# Patient Record
Sex: FEMALE | Race: ASIAN | NOT HISPANIC OR LATINO | Employment: UNEMPLOYED | URBAN - METROPOLITAN AREA
[De-identification: names, ages, dates, MRNs, and addresses within clinical notes are randomized per-mention and may not be internally consistent; named-entity substitution may affect disease eponyms.]

---

## 2022-01-20 ENCOUNTER — TELEPHONE (OUTPATIENT)
Dept: SCHEDULING | Facility: IMAGING CENTER | Age: 52
End: 2022-01-20

## 2022-10-05 LAB
CHOLEST SERPL-MCNC: 218 MG/DL
CREAT SERPL-MCNC: 0.82 MG/DL
HBA1C MFR BLD: 5.9 % (ref ?–5.8)
HDLC SERPL-MCNC: 34 MG/DL
LDLC SERPL CALC-MCNC: 153 MG/DL
TRIGL SERPL-MCNC: 176 MG/DL

## 2023-04-24 RX ORDER — ATORVASTATIN CALCIUM 10 MG/1
10 TABLET, FILM COATED ORAL NIGHTLY
COMMUNITY
End: 2024-01-16 | Stop reason: SDUPTHER

## 2023-04-24 RX ORDER — LISINOPRIL 10 MG/1
10 TABLET ORAL DAILY
COMMUNITY
End: 2024-01-16 | Stop reason: SDUPTHER

## 2023-04-24 RX ORDER — SEMAGLUTIDE 1.34 MG/ML
INJECTION, SOLUTION SUBCUTANEOUS
COMMUNITY
End: 2024-01-16 | Stop reason: SDUPTHER

## 2023-04-25 ENCOUNTER — OFFICE VISIT (OUTPATIENT)
Dept: ENDOCRINOLOGY | Facility: MEDICAL CENTER | Age: 53
End: 2023-04-25
Attending: INTERNAL MEDICINE
Payer: COMMERCIAL

## 2023-04-25 VITALS
WEIGHT: 161 LBS | SYSTOLIC BLOOD PRESSURE: 108 MMHG | HEART RATE: 65 BPM | DIASTOLIC BLOOD PRESSURE: 66 MMHG | HEIGHT: 63 IN | OXYGEN SATURATION: 95 % | BODY MASS INDEX: 28.53 KG/M2

## 2023-04-25 DIAGNOSIS — E05.90 SUBCLINICAL HYPERTHYROIDISM: ICD-10-CM

## 2023-04-25 DIAGNOSIS — E55.9 VITAMIN D DEFICIENCY: ICD-10-CM

## 2023-04-25 DIAGNOSIS — E04.2 MULTINODULAR GOITER: ICD-10-CM

## 2023-04-25 DIAGNOSIS — Z79.4 CONTROLLED TYPE 2 DIABETES MELLITUS WITHOUT COMPLICATION, WITH LONG-TERM CURRENT USE OF INSULIN (HCC): ICD-10-CM

## 2023-04-25 DIAGNOSIS — E11.9 CONTROLLED TYPE 2 DIABETES MELLITUS WITHOUT COMPLICATION, WITH LONG-TERM CURRENT USE OF INSULIN (HCC): ICD-10-CM

## 2023-04-25 LAB
HBA1C MFR BLD: 5.8 % (ref ?–5.8)
POCT INT CON NEG: NEGATIVE
POCT INT CON POS: POSITIVE

## 2023-04-25 PROCEDURE — 83036 HEMOGLOBIN GLYCOSYLATED A1C: CPT | Performed by: INTERNAL MEDICINE

## 2023-04-25 PROCEDURE — 99211 OFF/OP EST MAY X REQ PHY/QHP: CPT | Performed by: INTERNAL MEDICINE

## 2023-04-25 PROCEDURE — 99205 OFFICE O/P NEW HI 60 MIN: CPT | Performed by: INTERNAL MEDICINE

## 2023-04-25 ASSESSMENT — PATIENT HEALTH QUESTIONNAIRE - PHQ9: CLINICAL INTERPRETATION OF PHQ2 SCORE: 0

## 2023-04-25 NOTE — PROGRESS NOTES
"Chief Complaint:  Consult requested by No primary care provider on file. for initial evaluation of Type 2 Diabetes Mellitus    HPI:   Shakila Dickerson is a 52 y.o. female with Type 2 Diabetes Mellitus diagnosed in .  She denies hospitalizations for DKA in the past.    Her a1c was 6.6% on 2022  She is now on Ozempic 0.5mg weekly  She reports that she tried and failed metformin (hyperglycemia)    Her a1c today is 5.8% on 2023    She monitors blood glucose  1 times per day. Blood glucose values range:Fastin-130                She was also referred for subclinical hyperthyroidism  She was found to have a low TSH of 0.43 on 2022   free T4 and free T3 was not measured  She denies symptoms of overt hyperthyroid such as unexplained weight loss, tremors, palpitations and insomnia.  Her thyroid labs have not been repeated.        She also has MNG with previous monitoring done by Dr. Barry  With FNA of RUL complex nodule with benign cytology      Last US was on 2022 was in [place] showing a stable RUL complex nodule 1.8 cm   RLL poorly visualized hypoechoic nodule 1.4 cm  On the left upper lobe she also has subcentimeter nodes measuring 8 mm  She also had 1.2 cm nodule left lower lobe      She had an US last 2023 at [place] which showed a significant increase in the size of the right upper lobe nodule from 1.8 to 2.2 cm  And is again increase in the size of the right lower lobe nodule to 1.5 cm  The left upper lobe nodule stable  The left lower lobe nodule stable    She denies a family history of cancer she denies radiation exposure to the head and neck.          She denies hypoglycemic episodes  She  denies hypoglycemic unawareness. She denies episodes of severe hypoglycemia requiring third party assistance.  She  is not wearing a medical alert bracelet or necklace.  She does not a glucagon emergency kit.    She denies attending diabetes education classes.  Diet: \"healthy\" diet  in " general.    Diabetes Complications   She  denies history of retinopathy.  She denies laser eye surgery. Last eye exam: She has not had a formal eye exam  She denies history of peripheral sensory neuropathy.  She denies numbness, tingling in both feet.  She denies history of foot sores.   She denies history of kidney damage.  She is not on ACE inhibitor or ARB.   She denies history of coronary artery disease.  She  denies history of stroke and denies TIA.  She denies history of PAD.  She denies history of hyperlipidemia.      ROS:     CONS:     No fever, no chills, no weight loss, no fatigue   EYES:      No diplopia, no blurry vision, no redness of eyes, no swelling of eyelids   ENT:    No hearing loss, No ear pain, No sore throat, no dysphagia, no neck swelling   CV:     No chest pain, no palpitations, no claudication, no orthopnea, no PND   PULM:    No SOB, no cough, no hemoptysis, no wheezing    GI:   No nausea, no vomiting, no diarrhea, no constipation, no bloody stools   :  Passing urine well, no dysuria, no hematuria   ENDO:   No polyuria, no polydipsia, no heat intolerance, no cold intolerance   NEURO: No headaches, no dizziness, no convulsions, no tremors   MUSC:  No joint swellings, no arthralgias, no myalgias, no weakness   SKIN:   No rash, no ulcers, no dry skin   PSYCH:   No depression, no anxiety, no difficulty sleeping       Past Medical History:  There are no problems to display for this patient.      Past Surgical History:  No past surgical history on file.     Allergies:  Patient has no known allergies.     Current Medications:    Current Outpatient Medications:     atorvastatin (LIPITOR) 10 MG Tab, Take 10 mg by mouth every evening., Disp: , Rfl:     lisinopril (PRINIVIL) 10 MG Tab, Take 10 mg by mouth every day., Disp: , Rfl:     Semaglutide, 1 MG/DOSE, (OZEMPIC, 1 MG/DOSE,) 4 MG/3ML Solution Pen-injector, Inject  under the skin., Disp: , Rfl:     Social History:  Social History     Socioeconomic  "History    Marital status:      Spouse name: Not on file    Number of children: Not on file    Years of education: Not on file    Highest education level: Not on file   Occupational History    Not on file   Tobacco Use    Smoking status: Not on file    Smokeless tobacco: Not on file   Substance and Sexual Activity    Alcohol use: Not on file    Drug use: Not on file    Sexual activity: Not on file   Other Topics Concern    Not on file   Social History Narrative    Not on file     Social Determinants of Health     Financial Resource Strain: Not on file   Food Insecurity: Not on file   Transportation Needs: Not on file   Physical Activity: Not on file   Stress: Not on file   Social Connections: Not on file   Intimate Partner Violence: Not on file   Housing Stability: Not on file        Family History:   No family history on file.    PHYSICAL EXAM:   Vital signs: /66 (BP Location: Left arm, Patient Position: Sitting)   Pulse 65   Ht 1.6 m (5' 3\")   Wt 73 kg (161 lb)   SpO2 95%   BMI 28.52 kg/m²   GENERAL: Well-developed, well-nourished  in no apparent distress.   EYE: No ocular and eyelid asymmetry, Anicteric sclerae,  PERRL, No exophthalmos or lidlag  HENT: Hearing grossly intact, Normocephalic, atraumatic. Pink, moist mucous membranes, No exudate  NECK: Supple. Trachea midline. thyroid enlarged with a palpable nodule on the right upper lobe and right lower lobe  CARDIOVASCULAR: Regular rate and rhythm. No murmurs, rubs, or gallops.   LUNGS: Clear to auscultation bilaterally   ABDOMEN: Soft, nontender with positive bowel sounds.   EXTREMITIES: No clubbing, cyanosis, or edema.   NEUROLOGICAL: Cranial nerves II-XII are grossly intact   Symmetric reflexes at the patella no proximal muscle weakness, No visible tremor with both outstretched hands  LYMPH: No cervical, supraclavicular,  adenopathy palpated.   SKIN: No rashes, lesions. Turgor is normal.  FOOT: Normal sensation to monofilament testing, normal " pulses, no ulcers.  Normal Vibration quantitative sensation test.    Labs:  Lab Results   Component Value Date/Time    HBA1C 5.8 04/25/2023 0919       No results found for: WBC, RBC, HEMOGLOBIN, MCV, MCH, MCHC, RDW, MPV    Lab Results   Component Value Date/Time    SODIUM 138 12/07/2020 09:57 AM    POTASSIUM 3.9 12/07/2020 09:57 AM    CHLORIDE 106 12/07/2020 09:57 AM    CO2 25 12/07/2020 09:57 AM    ANION 7 12/07/2020 09:57 AM    BUN 12 12/07/2020 09:57 AM    CREATININE 0.82 10/05/2022 12:00 AM    CALCIUM 9.3 12/07/2020 09:57 AM       Lab Results   Component Value Date/Time    CHOLSTRLTOT 218 10/05/2022 0000    TRIGLYCERIDE 176 10/05/2022 0000    HDL 34 10/05/2022 0000     10/05/2022 0000       No results found for: MICROALBCALC, MALBCRT, MALBEXCR, NEWJPO36, MICROALBUR, MICRALB, UMICROALBUM, MICROALBTIM     No results found for: TSHULTRASEN  No results found for: FREEDIR  No results found for: FREET3  No results found for: THYSTIMIG      ASSESSMENT/PLAN:     1. Controlled type 2 diabetes mellitus without complication, with long-term current use of insulin (HCC)  Controlled  Reviewed pathogenesis of type 2 diabetes and reports good glucose control  Continue Ozempic 0.5 mg weekly  Reviewed side effects benefits and alternatives to the medication.  Reviewed proper administration of medication  I want to get updated labs to check her CBC, CMP, urine microalbumin, fasting lipids and other labs  I will update her   I am going to see her again in a couple months for biopsy of her thyroid nodules.  We plan to repeat her A1c again in 3 to 6 months    2. Subclinical hyperthyroidism  Stable  She appears clinically euthyroid  Her TSH was only mildly low  I suspect she has subclinical hyperthyroidism because of autonomous thyroid nodules  I want her to get repeat her labs to check her TSH and free T4 and T3 and also recheck her TSI and thyroid receptor antibodies and I will update her.    3. Multinodular goiter  Unstable    Reviewed pathogenesis of thyroid nodules and the risk of growth and risk of malignancies  I am going to schedule her for biopsy of the right upper lobe and right lower lobe nodule in the office    4. Vitamin D deficiency  Stable we will check calcium vitamin D with upcoming labs      Return in about 4 months (around 8/25/2023).    Total time spent on day of service was over 60 minutes which included obtaining a detailed history and physical exam, ordering labs, coordinating care and scheduling future follow-up      Thank you kindly for allowing me to participate in the diabetes care plan for this patient.    Jasiel Nelson MD, FACE, [facility]  04/25/23    CC:   No primary care provider on file.

## 2023-04-29 ENCOUNTER — HOSPITAL ENCOUNTER (OUTPATIENT)
Dept: LAB | Facility: MEDICAL CENTER | Age: 53
End: 2023-04-29
Attending: INTERNAL MEDICINE
Payer: COMMERCIAL

## 2023-04-29 DIAGNOSIS — Z79.4 CONTROLLED TYPE 2 DIABETES MELLITUS WITHOUT COMPLICATION, WITH LONG-TERM CURRENT USE OF INSULIN (HCC): ICD-10-CM

## 2023-04-29 DIAGNOSIS — E55.9 VITAMIN D DEFICIENCY: ICD-10-CM

## 2023-04-29 DIAGNOSIS — E11.9 CONTROLLED TYPE 2 DIABETES MELLITUS WITHOUT COMPLICATION, WITH LONG-TERM CURRENT USE OF INSULIN (HCC): ICD-10-CM

## 2023-04-29 DIAGNOSIS — E04.2 MULTINODULAR GOITER: ICD-10-CM

## 2023-04-29 DIAGNOSIS — E05.90 SUBCLINICAL HYPERTHYROIDISM: ICD-10-CM

## 2023-04-29 LAB
25(OH)D3 SERPL-MCNC: 19 NG/ML (ref 30–100)
ALBUMIN SERPL BCP-MCNC: 4.6 G/DL (ref 3.2–4.9)
ALBUMIN/GLOB SERPL: 1.8 G/DL
ALP SERPL-CCNC: 104 U/L (ref 30–99)
ALT SERPL-CCNC: 33 U/L (ref 2–50)
ANION GAP SERPL CALC-SCNC: 13 MMOL/L (ref 7–16)
AST SERPL-CCNC: 22 U/L (ref 12–45)
BASOPHILS # BLD AUTO: 0.7 % (ref 0–1.8)
BASOPHILS # BLD: 0.05 K/UL (ref 0–0.12)
BILIRUB SERPL-MCNC: 0.6 MG/DL (ref 0.1–1.5)
BUN SERPL-MCNC: 17 MG/DL (ref 8–22)
CALCIUM ALBUM COR SERPL-MCNC: 8.9 MG/DL (ref 8.5–10.5)
CALCIUM SERPL-MCNC: 9.4 MG/DL (ref 8.5–10.5)
CHLORIDE SERPL-SCNC: 106 MMOL/L (ref 96–112)
CHOLEST SERPL-MCNC: 155 MG/DL (ref 100–199)
CO2 SERPL-SCNC: 23 MMOL/L (ref 20–33)
CREAT SERPL-MCNC: 0.74 MG/DL (ref 0.5–1.4)
CREAT UR-MCNC: 180.73 MG/DL
EOSINOPHIL # BLD AUTO: 0.11 K/UL (ref 0–0.51)
EOSINOPHIL NFR BLD: 1.6 % (ref 0–6.9)
ERYTHROCYTE [DISTWIDTH] IN BLOOD BY AUTOMATED COUNT: 38.8 FL (ref 35.9–50)
FASTING STATUS PATIENT QL REPORTED: NORMAL
GFR SERPLBLD CREATININE-BSD FMLA CKD-EPI: 97 ML/MIN/1.73 M 2
GLOBULIN SER CALC-MCNC: 2.6 G/DL (ref 1.9–3.5)
GLUCOSE SERPL-MCNC: 101 MG/DL (ref 65–99)
HCT VFR BLD AUTO: 43.3 % (ref 37–47)
HDLC SERPL-MCNC: 41 MG/DL
HGB BLD-MCNC: 14.7 G/DL (ref 12–16)
IMM GRANULOCYTES # BLD AUTO: 0.02 K/UL (ref 0–0.11)
IMM GRANULOCYTES NFR BLD AUTO: 0.3 % (ref 0–0.9)
LDLC SERPL CALC-MCNC: 84 MG/DL
LYMPHOCYTES # BLD AUTO: 2.16 K/UL (ref 1–4.8)
LYMPHOCYTES NFR BLD: 30.8 % (ref 22–41)
MCH RBC QN AUTO: 29.6 PG (ref 27–33)
MCHC RBC AUTO-ENTMCNC: 33.9 G/DL (ref 33.6–35)
MCV RBC AUTO: 87.3 FL (ref 81.4–97.8)
MICROALBUMIN UR-MCNC: 2.7 MG/DL
MICROALBUMIN/CREAT UR: 15 MG/G (ref 0–30)
MONOCYTES # BLD AUTO: 0.45 K/UL (ref 0–0.85)
MONOCYTES NFR BLD AUTO: 6.4 % (ref 0–13.4)
NEUTROPHILS # BLD AUTO: 4.23 K/UL (ref 2–7.15)
NEUTROPHILS NFR BLD: 60.2 % (ref 44–72)
NRBC # BLD AUTO: 0 K/UL
NRBC BLD-RTO: 0 /100 WBC
PLATELET # BLD AUTO: 316 K/UL (ref 164–446)
PMV BLD AUTO: 9.5 FL (ref 9–12.9)
POTASSIUM SERPL-SCNC: 4.4 MMOL/L (ref 3.6–5.5)
PROT SERPL-MCNC: 7.2 G/DL (ref 6–8.2)
RBC # BLD AUTO: 4.96 M/UL (ref 4.2–5.4)
SODIUM SERPL-SCNC: 142 MMOL/L (ref 135–145)
T3FREE SERPL-MCNC: 3.43 PG/ML (ref 2–4.4)
T4 FREE SERPL-MCNC: 1.28 NG/DL (ref 0.93–1.7)
TRIGL SERPL-MCNC: 152 MG/DL (ref 0–149)
TSH SERPL DL<=0.005 MIU/L-ACNC: 0.64 UIU/ML (ref 0.38–5.33)
WBC # BLD AUTO: 7 K/UL (ref 4.8–10.8)

## 2023-04-29 PROCEDURE — 83520 IMMUNOASSAY QUANT NOS NONAB: CPT

## 2023-04-29 PROCEDURE — 80061 LIPID PANEL: CPT

## 2023-04-29 PROCEDURE — 82570 ASSAY OF URINE CREATININE: CPT

## 2023-04-29 PROCEDURE — 80053 COMPREHEN METABOLIC PANEL: CPT

## 2023-04-29 PROCEDURE — 84445 ASSAY OF TSI GLOBULIN: CPT

## 2023-04-29 PROCEDURE — 82306 VITAMIN D 25 HYDROXY: CPT

## 2023-04-29 PROCEDURE — 84481 FREE ASSAY (FT-3): CPT

## 2023-04-29 PROCEDURE — 84443 ASSAY THYROID STIM HORMONE: CPT

## 2023-04-29 PROCEDURE — 82043 UR ALBUMIN QUANTITATIVE: CPT

## 2023-04-29 PROCEDURE — 36415 COLL VENOUS BLD VENIPUNCTURE: CPT

## 2023-04-29 PROCEDURE — 84439 ASSAY OF FREE THYROXINE: CPT

## 2023-04-29 PROCEDURE — 85025 COMPLETE CBC W/AUTO DIFF WBC: CPT

## 2023-05-01 LAB
TSH RECEP AB SER-ACNC: <0.8 IU/L
TSI SER-ACNC: <0.1 IU/L

## 2023-08-09 PROBLEM — M65.331 TRIGGER FINGER, RIGHT MIDDLE FINGER: Status: ACTIVE | Noted: 2023-08-09

## 2023-09-11 ENCOUNTER — PROCEDURE VISIT (OUTPATIENT)
Dept: ENDOCRINOLOGY | Facility: MEDICAL CENTER | Age: 53
End: 2023-09-11
Attending: INTERNAL MEDICINE
Payer: COMMERCIAL

## 2023-09-11 ENCOUNTER — HOSPITAL ENCOUNTER (OUTPATIENT)
Facility: MEDICAL CENTER | Age: 53
End: 2023-09-11
Attending: INTERNAL MEDICINE
Payer: COMMERCIAL

## 2023-09-11 DIAGNOSIS — E55.9 VITAMIN D DEFICIENCY: ICD-10-CM

## 2023-09-11 DIAGNOSIS — E04.2 MULTINODULAR GOITER: ICD-10-CM

## 2023-09-11 DIAGNOSIS — E11.9 CONTROLLED TYPE 2 DIABETES MELLITUS WITHOUT COMPLICATION, WITH LONG-TERM CURRENT USE OF INSULIN (HCC): ICD-10-CM

## 2023-09-11 DIAGNOSIS — Z79.4 CONTROLLED TYPE 2 DIABETES MELLITUS WITHOUT COMPLICATION, WITH LONG-TERM CURRENT USE OF INSULIN (HCC): ICD-10-CM

## 2023-09-11 DIAGNOSIS — E05.90 SUBCLINICAL HYPERTHYROIDISM: ICD-10-CM

## 2023-09-11 LAB — PATHOLOGY CONSULT NOTE: NORMAL

## 2023-09-11 PROCEDURE — 10005 FNA BX W/US GDN 1ST LES: CPT | Performed by: INTERNAL MEDICINE

## 2023-09-11 PROCEDURE — 10006 FNA BX W/US GDN EA ADDL: CPT | Mod: 26 | Performed by: INTERNAL MEDICINE

## 2023-09-11 PROCEDURE — 10005 FNA BX W/US GDN 1ST LES: CPT | Mod: 26 | Performed by: INTERNAL MEDICINE

## 2023-09-11 PROCEDURE — 10006 FNA BX W/US GDN EA ADDL: CPT | Performed by: INTERNAL MEDICINE

## 2023-09-11 PROCEDURE — 88173 CYTOPATH EVAL FNA REPORT: CPT

## 2023-09-11 NOTE — PROGRESS NOTES
POC US guided FNA procedure was completed today for RUL 2.2 cm nodule and RLL 1.5 cm nodule    Please see endocrinologist procedure note  Patient tolerated procedure well without complaints.  Patient was advised that she will be contacted regarding the results and next plan  Patient was advised to follow up as planned with labs prior     Jasiel Nelson M.D.

## 2024-01-11 DIAGNOSIS — E04.2 MULTINODULAR GOITER: ICD-10-CM

## 2024-01-11 LAB
25(OH)D3+25(OH)D2 SERPL-MCNC: 48.4 NG/ML (ref 30–100)
ALBUMIN SERPL-MCNC: 4.8 G/DL (ref 3.8–4.9)
ALBUMIN/CREAT UR: 53 MG/G CREAT (ref 0–29)
ALBUMIN/GLOB SERPL: 1.8 {RATIO} (ref 1.2–2.2)
ALP SERPL-CCNC: 101 IU/L (ref 44–121)
ALT SERPL-CCNC: 33 IU/L (ref 0–32)
AST SERPL-CCNC: 23 IU/L (ref 0–40)
BILIRUB SERPL-MCNC: 0.5 MG/DL (ref 0–1.2)
BUN SERPL-MCNC: 13 MG/DL (ref 6–24)
BUN/CREAT SERPL: 17 (ref 9–23)
CALCIUM SERPL-MCNC: 9.8 MG/DL (ref 8.7–10.2)
CHLORIDE SERPL-SCNC: 103 MMOL/L (ref 96–106)
CHOLEST SERPL-MCNC: 253 MG/DL (ref 100–199)
CO2 SERPL-SCNC: 23 MMOL/L (ref 20–29)
CREAT SERPL-MCNC: 0.77 MG/DL (ref 0.57–1)
CREAT UR-MCNC: 109 MG/DL
EGFRCR SERPLBLD CKD-EPI 2021: 93 ML/MIN/1.73
GLOBULIN SER CALC-MCNC: 2.7 G/DL (ref 1.5–4.5)
GLUCOSE SERPL-MCNC: 98 MG/DL (ref 70–99)
HDLC SERPL-MCNC: 43 MG/DL
LABORATORY COMMENT REPORT: ABNORMAL
LDLC SERPL CALC-MCNC: 158 MG/DL (ref 0–99)
MICROALBUMIN UR-MCNC: 58.1 UG/ML
POTASSIUM SERPL-SCNC: 4.3 MMOL/L (ref 3.5–5.2)
PROT SERPL-MCNC: 7.5 G/DL (ref 6–8.5)
SODIUM SERPL-SCNC: 140 MMOL/L (ref 134–144)
T3FREE SERPL-MCNC: 3.5 PG/ML (ref 2–4.4)
T4 FREE SERPL-MCNC: 1.51 NG/DL (ref 0.82–1.77)
TRIGL SERPL-MCNC: 281 MG/DL (ref 0–149)
TSH RECEP AB SER-ACNC: <1.1 IU/L (ref 0–1.75)
TSH SERPL DL<=0.005 MIU/L-ACNC: 0.62 UIU/ML (ref 0.45–4.5)
TSI SER-ACNC: <0.1 IU/L (ref 0–0.55)
VLDLC SERPL CALC-MCNC: 52 MG/DL (ref 5–40)

## 2024-01-16 ENCOUNTER — OFFICE VISIT (OUTPATIENT)
Dept: ENDOCRINOLOGY | Facility: MEDICAL CENTER | Age: 54
End: 2024-01-16
Attending: INTERNAL MEDICINE
Payer: COMMERCIAL

## 2024-01-16 VITALS
OXYGEN SATURATION: 95 % | DIASTOLIC BLOOD PRESSURE: 64 MMHG | HEIGHT: 62 IN | SYSTOLIC BLOOD PRESSURE: 130 MMHG | WEIGHT: 165 LBS | HEART RATE: 72 BPM | BODY MASS INDEX: 30.36 KG/M2

## 2024-01-16 DIAGNOSIS — E04.2 MULTINODULAR GOITER: ICD-10-CM

## 2024-01-16 DIAGNOSIS — E11.9 CONTROLLED TYPE 2 DIABETES MELLITUS WITHOUT COMPLICATION, WITH LONG-TERM CURRENT USE OF INSULIN (HCC): ICD-10-CM

## 2024-01-16 DIAGNOSIS — E05.90 SUBCLINICAL HYPERTHYROIDISM: ICD-10-CM

## 2024-01-16 DIAGNOSIS — E55.9 VITAMIN D DEFICIENCY: ICD-10-CM

## 2024-01-16 DIAGNOSIS — R80.9 MICROALBUMINURIA: ICD-10-CM

## 2024-01-16 DIAGNOSIS — Z79.4 CONTROLLED TYPE 2 DIABETES MELLITUS WITHOUT COMPLICATION, WITH LONG-TERM CURRENT USE OF INSULIN (HCC): ICD-10-CM

## 2024-01-16 DIAGNOSIS — E78.5 DYSLIPIDEMIA: ICD-10-CM

## 2024-01-16 LAB
HBA1C MFR BLD: 5.9 % (ref ?–5.8)
POCT INT CON NEG: NEGATIVE
POCT INT CON POS: POSITIVE

## 2024-01-16 PROCEDURE — 99211 OFF/OP EST MAY X REQ PHY/QHP: CPT | Performed by: INTERNAL MEDICINE

## 2024-01-16 PROCEDURE — 3078F DIAST BP <80 MM HG: CPT | Performed by: INTERNAL MEDICINE

## 2024-01-16 PROCEDURE — 83036 HEMOGLOBIN GLYCOSYLATED A1C: CPT | Performed by: INTERNAL MEDICINE

## 2024-01-16 PROCEDURE — 3075F SYST BP GE 130 - 139MM HG: CPT | Performed by: INTERNAL MEDICINE

## 2024-01-16 PROCEDURE — 99215 OFFICE O/P EST HI 40 MIN: CPT | Performed by: INTERNAL MEDICINE

## 2024-01-16 RX ORDER — LISINOPRIL 10 MG/1
10 TABLET ORAL DAILY
Qty: 90 TABLET | Refills: 3 | Status: SHIPPED | OUTPATIENT
Start: 2024-01-16

## 2024-01-16 RX ORDER — SEMAGLUTIDE 1.34 MG/ML
1 INJECTION, SOLUTION SUBCUTANEOUS
Qty: 9 ML | Refills: 3 | Status: SHIPPED | OUTPATIENT
Start: 2024-01-16

## 2024-01-16 RX ORDER — ATORVASTATIN CALCIUM 20 MG/1
20 TABLET, FILM COATED ORAL NIGHTLY
Qty: 90 TABLET | Refills: 3 | Status: SHIPPED | OUTPATIENT
Start: 2024-01-16

## 2024-01-16 ASSESSMENT — FIBROSIS 4 INDEX: FIB4 SCORE: 0.66

## 2024-01-16 NOTE — PROGRESS NOTES
CHIEF COMPLAINT: Patient is here for follow up of Type 2 Diabetes Mellitus    HPI:     Gemxena Dickerson is a 53 y.o. female with Type 2 Diabetes Mellitus here for follow up.    Labs from 1/16/2024 HbA1c is 5.9%    She was diagnosed with type 2 diabetes in 2022 and has been on Ozempic monotherapy since that time.  She had previously tried and failed metformin because of severe hyperglycemia.    She has a history of subclinical hyperthyroidism with a low TSH of 0.43 on July 2022 but she was asymptomatic.  T4 and T3 levels on serial monitoring were normal and on serial monitoring later on her TSH returned to normal.  Workup for Graves' disease was unremarkable as her TSI and thyrotropin receptor antibodies were normal.    Baseline thyroid ultrasound at [facility] on August 2022 showed multinodular goiter with a right upper lobe nodule measuring 2.2 cm on April 2023 and right lower lobe solid nodule measuring 1.5 cm.  She denies a family history of thyroid cancer denies radiation exposure to the head and neck.    Ultrasound-guided biopsy of her thyroid nodules on September 2023 came back benign    Social history wise she works at [employer] and does shift work.  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  She has been taking red yeast rice and her LDL cholesterol still elevated.  She denies any side effects with atorvastatin.      She has new onset microalbuminuria seen on her latest labs  She is on an ACE inhibitor  UACR was 53 on January 2024  UACR was less than 30 on April 2023      Her  vitamin D is normal at 48 on June 2024      Eye exam was completed today and   foot exam was completed today      With respect to her subclinical hyperthyroidism there is no evidence of recurrence or progression of this condition    Her most recent TSH is normal at 0.619 with a normal free T4 of 1.51 and normal free T3 of 3.5 on July 2024 TSI and thyrotropin receptor antibodies were both negative ruling out Graves' disease.    She denies progressive compressive symptoms from her multinodular goiter    Her last formal ultrasound was completed at [facility] on April 2023 showing a 2.2 cm solid nodule in the right upper lobe and a 1.5 cm solid nodule in the right lower lobe which were biopsied and proven benign on September 2023        BG Diary:01/16/24  patient is not required to test her sugars    Weight has been stable    Diabetes Complications   Retinopathy: No known retinopathy.  Last eye exam: Point-of-care eye exam was completed today  Neuropathy: Denies paresthesias or numbness in hands or feet. Denies any foot wounds.  Exercise: Minimal.  Diet: Fair.  Patient's medications, allergies, and social histories were reviewed and updated as appropriate.    ROS:     CONS:     No fever, no chills   EYES:     No diplopia, no blurry vision   CV:           No chest pain, no palpitations   PULM:     No SOB, no cough, no hemoptysis.   GI:            No nausea, no vomiting, no diarrhea, no constipation   ENDO:     No polyuria, no polydipsia, no heat intolerance, no cold intolerance       Past Medical History:  Problem List:  2024-01: Dyslipidemia  2023-08: Trigger finger, right middle finger  2023-04: Subclinical hyperthyroidism  2023-04: Controlled type 2 diabetes mellitus without complication,   with long-term current use of insulin (HCC)  2023-04: Multinodular goiter      Past Surgical History:  History reviewed. No pertinent surgical history.     Allergies:  Patient has no known allergies.     Social History:  Social History  "    Tobacco Use    Smoking status: Never    Smokeless tobacco: Never        Family History:   family history is not on file.      PHYSICAL EXAM:   OBJECTIVE:  Vital signs: /64 (BP Location: Right arm, Patient Position: Sitting, BP Cuff Size: Adult)   Pulse 72   Ht 1.575 m (5' 2\")   Wt 74.8 kg (165 lb)   SpO2 95%   BMI 30.18 kg/m²   GENERAL: Well-developed, well-nourished in no apparent distress.   EYE:  No ocular asymmetry, PERRLA  HENT: Pink, moist mucous membranes.    NECK: Thyroid is not large  CARDIOVASCULAR:  No murmurs  LUNGS: Clear breath sounds  ABDOMEN: Soft, nontender   EXTREMITIES: No clubbing, cyanosis, or edema.   NEUROLOGICAL: No gross focal motor abnormalities   LYMPH: No cervical adenopathy palpated.   SKIN: No rashes, lesions.     Labs:  Lab Results   Component Value Date/Time    HBA1C 5.9 (A) 01/16/2024 11:01 AM        Lab Results   Component Value Date/Time    WBC 7.0 04/29/2023 07:54 AM    RBC 4.96 04/29/2023 07:54 AM    HEMOGLOBIN 14.7 04/29/2023 07:54 AM    MCV 87.3 04/29/2023 07:54 AM    MCH 29.6 04/29/2023 07:54 AM    MCHC 33.9 04/29/2023 07:54 AM    RDW 38.8 04/29/2023 07:54 AM    MPV 9.5 04/29/2023 07:54 AM       Lab Results   Component Value Date/Time    SODIUM 140 01/08/2024 09:12 AM    SODIUM 142 04/29/2023 07:54 AM    POTASSIUM 4.3 01/08/2024 09:12 AM    POTASSIUM 4.4 04/29/2023 07:54 AM    CHLORIDE 103 01/08/2024 09:12 AM    CHLORIDE 106 04/29/2023 07:54 AM    CO2 23 01/08/2024 09:12 AM    CO2 23 04/29/2023 07:54 AM    ANION 13.0 04/29/2023 07:54 AM    GLUCOSE 98 01/08/2024 09:12 AM    GLUCOSE 101 (H) 04/29/2023 07:54 AM    BUN 13 01/08/2024 09:12 AM    BUN 17 04/29/2023 07:54 AM    CREATININE 0.77 01/08/2024 09:12 AM    CREATININE 0.74 04/29/2023 07:54 AM    CREATININE 0.82 10/05/2022 12:00 AM    CALCIUM 9.8 01/08/2024 09:12 AM    CALCIUM 9.4 04/29/2023 07:54 AM    ASTSGOT 23 01/08/2024 09:12 AM    ASTSGOT 22 04/29/2023 07:54 AM    ALTSGPT 33 (H) 01/08/2024 09:12 AM    " "ALTSGPT 33 04/29/2023 07:54 AM    TBILIRUBIN 0.5 01/08/2024 09:12 AM    TBILIRUBIN 0.6 04/29/2023 07:54 AM    ALBUMIN 4.8 01/08/2024 09:12 AM    ALBUMIN 4.6 04/29/2023 07:54 AM    TOTPROTEIN 7.5 01/08/2024 09:12 AM    TOTPROTEIN 7.2 04/29/2023 07:54 AM    GLOBULIN 2.7 01/08/2024 09:12 AM    GLOBULIN 2.6 04/29/2023 07:54 AM    AGRATIO 1.8 01/08/2024 09:12 AM    AGRATIO 1.8 04/29/2023 07:54 AM       Lab Results   Component Value Date/Time    CHOLSTRLTOT 253 (H) 01/08/2024 0912    TRIGLYCERIDE 281 (H) 01/08/2024 0912    HDL 43 01/08/2024 0912    LDL 84 04/29/2023 0754       Lab Results   Component Value Date/Time    MALBCRT 15 04/29/2023 07:54 AM    MICROALBUR 2.7 04/29/2023 07:54 AM    MICRALB 58.1 01/08/2024 09:12 AM        Lab Results   Component Value Date/Time    TSHULTRASEN 0.640 04/29/2023 0754     Lab Results   Component Value Date/Time    FREEDIR 1.51 01/08/2024 0912     Lab Results   Component Value Date/Time    FREET3 3.43 04/29/2023 0754     No results found for: \"THYSTIMIG\"        ASSESSMENT/PLAN:     1. Controlled type 2 diabetes mellitus without complication, with long-term current use of insulin (HCC)  Well-controlled  Continue Ozempic 1 mg weekly  She is up-to-date with her labs  Foot exam was completed today  Eye exam was completed today  Follow-up for thyroid ultrasound the office in 3 months we will make her diabetes appoint for her in 6 months    2. Subclinical hyperthyroidism  Resolved she does not have endogenous progressive subclinical hypothyroidism and there is no evidence of Graves' disease   we will monitor thyroid function annually at this time    3. Multinodular goiter  Stable  She has benign dominant solid nodules on the right upper lobe and right lower lobe both measuring more than 1.5 to 2 cm with benign biopsies  Recommend observation and schedule her for ultrasound in the office in 3 months    4. Dyslipidemia  Uncontrolled  Stop red yeast rice and restart atorvastatin  Repeat fasting " lipids in 3 to 6 months    5. Vitamin D deficiency  Stable   Vitamin D labs were reviewed with patient  Continue current supplements  Continue monitoring levels       6. Microalbuminuria  Unstable  Continue ACE inhibitor  Consider SGLT2 inhibitor in the future if this is progressive  Consider Kerendia if this is progressive  Continue monitoring urine albumin every 3 to 6 months      Return in about 3 months (around 4/16/2024).        Total time spent on day of service was over 60 minutes which included obtaining a detailed history and physical exam, ordering labs, coordinating care and scheduling future follow-up      This patient during there office visit today was started on a new medication.  Side effects of the new medication were discussed with the patient today in the office.     Thank you kindly for allowing me to participate in the diabetes care plan for this patient.    Jasiel Nelson MD, [facility], [facility]  01/16/24    CC:   Malika Ace M.D.

## 2024-01-18 LAB — RETINAL SCREEN: NORMAL

## 2024-04-08 ENCOUNTER — PROCEDURE VISIT (OUTPATIENT)
Dept: ENDOCRINOLOGY | Facility: MEDICAL CENTER | Age: 54
End: 2024-04-08
Attending: INTERNAL MEDICINE
Payer: COMMERCIAL

## 2024-04-08 DIAGNOSIS — E55.9 VITAMIN D DEFICIENCY: ICD-10-CM

## 2024-04-08 DIAGNOSIS — E78.5 DYSLIPIDEMIA: ICD-10-CM

## 2024-04-08 DIAGNOSIS — E05.90 SUBCLINICAL HYPERTHYROIDISM: ICD-10-CM

## 2024-04-08 DIAGNOSIS — E11.9 CONTROLLED TYPE 2 DIABETES MELLITUS WITHOUT COMPLICATION, WITH LONG-TERM CURRENT USE OF INSULIN (HCC): ICD-10-CM

## 2024-04-08 DIAGNOSIS — E04.2 MULTINODULAR GOITER: ICD-10-CM

## 2024-04-08 DIAGNOSIS — Z79.4 CONTROLLED TYPE 2 DIABETES MELLITUS WITHOUT COMPLICATION, WITH LONG-TERM CURRENT USE OF INSULIN (HCC): ICD-10-CM

## 2024-04-08 PROCEDURE — 76536 US EXAM OF HEAD AND NECK: CPT | Performed by: INTERNAL MEDICINE

## 2024-04-08 RX ORDER — SEMAGLUTIDE 1.34 MG/ML
1 INJECTION, SOLUTION SUBCUTANEOUS
Qty: 9 ML | Refills: 3 | Status: SHIPPED | OUTPATIENT
Start: 2024-04-08

## 2024-04-08 NOTE — PROGRESS NOTES
Thyroid US done on this procedure visit  She has stable benign spongiform nodules on the RUL, RLL and LLL  Please see dictated POC US report completed by endocrinologist  I refilled her ozempic and made her a 1 year diabetes follow up   With complete labs  Patient advised to follow up as planned with labs prior    Jasiel Nelson M.D.

## 2025-03-11 DIAGNOSIS — E78.5 DYSLIPIDEMIA: ICD-10-CM

## 2025-03-12 RX ORDER — ATORVASTATIN CALCIUM 20 MG/1
20 TABLET, FILM COATED ORAL NIGHTLY
Qty: 90 TABLET | Refills: 0 | Status: SHIPPED | OUTPATIENT
Start: 2025-03-12

## 2025-03-26 LAB
CHOLEST SERPL-MCNC: 146 MG/DL
HBA1C MFR BLD: 7.4 % (ref ?–5.8)
HDLC SERPL-MCNC: 40 MG/DL
LDLC SERPL CALC-MCNC: 78 MG/DL
TRIGL SERPL-MCNC: 159 MG/DL

## 2025-04-25 ENCOUNTER — APPOINTMENT (OUTPATIENT)
Facility: MEDICAL CENTER | Age: 55
End: 2025-04-25
Payer: COMMERCIAL

## 2025-04-25 ENCOUNTER — OFFICE VISIT (OUTPATIENT)
Dept: ENDOCRINOLOGY | Facility: MEDICAL CENTER | Age: 55
End: 2025-04-25
Attending: INTERNAL MEDICINE
Payer: COMMERCIAL

## 2025-04-25 VITALS
DIASTOLIC BLOOD PRESSURE: 94 MMHG | OXYGEN SATURATION: 98 % | HEART RATE: 80 BPM | BODY MASS INDEX: 29.95 KG/M2 | WEIGHT: 169 LBS | SYSTOLIC BLOOD PRESSURE: 137 MMHG | HEIGHT: 63 IN

## 2025-04-25 DIAGNOSIS — E04.2 MULTINODULAR GOITER: ICD-10-CM

## 2025-04-25 DIAGNOSIS — E78.5 DYSLIPIDEMIA: ICD-10-CM

## 2025-04-25 DIAGNOSIS — E55.9 VITAMIN D DEFICIENCY: ICD-10-CM

## 2025-04-25 DIAGNOSIS — Z79.4 CONTROLLED TYPE 2 DIABETES MELLITUS WITHOUT COMPLICATION, WITH LONG-TERM CURRENT USE OF INSULIN (HCC): ICD-10-CM

## 2025-04-25 DIAGNOSIS — E11.9 CONTROLLED TYPE 2 DIABETES MELLITUS WITHOUT COMPLICATION, WITH LONG-TERM CURRENT USE OF INSULIN (HCC): ICD-10-CM

## 2025-04-25 DIAGNOSIS — R80.9 MICROALBUMINURIA: ICD-10-CM

## 2025-04-25 DIAGNOSIS — E05.90 SUBCLINICAL HYPERTHYROIDISM: ICD-10-CM

## 2025-04-25 PROCEDURE — 99211 OFF/OP EST MAY X REQ PHY/QHP: CPT | Performed by: INTERNAL MEDICINE

## 2025-04-25 RX ORDER — SEMAGLUTIDE 1.34 MG/ML
1 INJECTION, SOLUTION SUBCUTANEOUS
Qty: 9 ML | Refills: 3 | Status: SHIPPED | OUTPATIENT
Start: 2025-04-25

## 2025-04-25 RX ORDER — METFORMIN HYDROCHLORIDE 500 MG/1
1000 TABLET, EXTENDED RELEASE ORAL 2 TIMES DAILY
Qty: 360 TABLET | Refills: 3 | Status: SHIPPED | OUTPATIENT
Start: 2025-04-25

## 2025-04-25 ASSESSMENT — FIBROSIS 4 INDEX: FIB4 SCORE: 0.67

## 2025-04-25 NOTE — PROGRESS NOTES
CHIEF COMPLAINT: Patient is here for follow up of Type 2 Diabetes Mellitus    HPI:     Gemma General Jayla is a 54 y.o. female with Type 2 Diabetes Mellitus here for follow up.      Labs from 3/26/2025 Hba1c is 7.3%  Labs from 1/16/2024 HbA1c is 5.9%    She was diagnosed with type 2 diabetes in 2022 and has been on Ozempic monotherapy since that time.  She had previously tried and failed metformin because of severe hyperglycemia.    She has a history of subclinical hyperthyroidism with a low TSH of 0.43 on July 2022 but she was asymptomatic.  T4 and T3 levels on serial monitoring were normal and on serial monitoring later on her TSH returned to normal.  Workup for Graves' disease was unremarkable as her TSI and thyrotropin receptor antibodies were normal.    Baseline thyroid ultrasound at [facility] on August 2022 showed multinodular goiter with a right upper lobe nodule measuring 2.2 cm on April 2023 and right lower lobe solid nodule measuring 1.5 cm.  She denies a family history of thyroid cancer denies radiation exposure to the head and neck.    Ultrasound-guided biopsy of her thyroid nodules on September 2023 came back benign    Social history wise she works at [employer] and does shift work.  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          On follow-up   She remains on Ozempic 2 mg weekly      She denies any side effects   She reports that her insurance wont cover the Ozempic 2mg dose      She has uncontrolled hyperlipidemia total cholesterol was 250 and LDL cholesterol was 158 on January 2024  She is back on atorvastatin and she stopped red yeast rice  LDL C was 78 on 3/2025 ([facility])      She has new onset microalbuminuria seen on her latest labs  She is on an ACE inhibitor  We dont have an updated urine test  UACR was 53 on January 2024  UACR was less than 30 on April 2023      Her vitamin D is normal at 48 on June 2024      Eye exam was completed today and   foot exam was completed  today      With respect to her subclinical hyperthyroidism there is no evidence of recurrence or progression of this condition    Her most recent TSH is normal at 0.511 on 3/2025  TSI and thyrotropin receptor antibodies were both negative ruling out Graves' disease.    She denies progressive compressive symptoms from her multinodular goiter    Her formal ultrasound was completed at [place] on April 2023 showing a 2.2 cm solid nodule in the right upper lobe and a 1.5 cm solid nodule in the right lower lobe which were biopsied and proven benign on September 2023        Her formal US on April 2024 showed    Nodule #1  There is a(n) hypoechoic wider than tall spongiform nodule with ill-defined margins and no echogenic foci measuring 1.44 x 0.91 x 1.59 cm located on the right upper lobe near the isthmus. This nodule was previously biopsied and is stable     Nodule #2  There is a(n) hypoechoic wider than tall spongiform nodule with ill-defined margins and no echogenic foci measuring 1.05 x 0.59 x 1.01 cm located on the right lower lobe. This nodule was previously biopsied and is stable     Nodule #3  There is a(n) hypoechoic wider than tall spongiform nodule with smooth margins and no echogenic foci measuring 0.89 x 0.68 x 0.86 cm located on the left lower lobe. This nodule is newly detected      BG Diary:  patient is not required to test her sugars    Weight has been stable    Diabetes Complications   Retinopathy: No known retinopathy.  Last eye exam: Point-of-care eye exam was completed today  Neuropathy: Denies paresthesias or numbness in hands or feet. Denies any foot wounds.  Exercise: Minimal.  Diet: Fair.  Patient's medications, allergies, and social histories were reviewed and updated as appropriate.    ROS:     CONS:     No fever, no chills   EYES:     No diplopia, no blurry vision   CV:           No chest pain, no palpitations   PULM:     No SOB, no cough, no hemoptysis.   GI:            No nausea, no vomiting,  "no diarrhea, no constipation   ENDO:     No polyuria, no polydipsia, no heat intolerance, no cold intolerance       Past Medical History:  Problem List:  2024-01: Dyslipidemia  2023-08: Trigger finger, right middle finger  2023-04: Subclinical hyperthyroidism  2023-04: Controlled type 2 diabetes mellitus without complication,   with long-term current use of insulin ([facility])  2023-04: Multinodular goiter      Past Surgical History:  History reviewed. No pertinent surgical history.     Allergies:  Patient has no known allergies.     Social History:  Social History     Tobacco Use    Smoking status: Never    Smokeless tobacco: Never        Family History:   family history is not on file.      PHYSICAL EXAM:   OBJECTIVE:  Vital signs: BP (!) 137/94 (BP Location: Left arm, Patient Position: Sitting, BP Cuff Size: Adult long)   Pulse 80   Ht 1.6 m (5' 3\")   Wt 76.7 kg (169 lb)   SpO2 98%   BMI 29.94 kg/m²   GENERAL: Well-developed, well-nourished in no apparent distress.   EYE:  No ocular asymmetry, PERRLA  HENT: Pink, moist mucous membranes.    NECK: Thyroid is not large  CARDIOVASCULAR:  No murmurs  LUNGS: Clear breath sounds  ABDOMEN: Soft, nontender   EXTREMITIES: No clubbing, cyanosis, or edema.   NEUROLOGICAL: No gross focal motor abnormalities   LYMPH: No cervical adenopathy palpated.   SKIN: No rashes, lesions.     Labs:  Lab Results   Component Value Date/Time    HBA1C 5.9 (A) 01/16/2024 11:01 AM        Lab Results   Component Value Date/Time    WBC 7.0 04/29/2023 07:54 AM    RBC 4.96 04/29/2023 07:54 AM    HEMOGLOBIN 14.7 04/29/2023 07:54 AM    MCV 87.3 04/29/2023 07:54 AM    MCH 29.6 04/29/2023 07:54 AM    MCHC 33.9 04/29/2023 07:54 AM    RDW 38.8 04/29/2023 07:54 AM    MPV 9.5 04/29/2023 07:54 AM       Lab Results   Component Value Date/Time    SODIUM 140 01/08/2024 09:12 AM    SODIUM 142 04/29/2023 07:54 AM    POTASSIUM 4.3 01/08/2024 09:12 AM    POTASSIUM 4.4 04/29/2023 07:54 AM    CHLORIDE 103 01/08/2024 " "09:12 AM    CHLORIDE 106 2023 07:54 AM    CO2 23 2024 09:12 AM    CO2 23 2023 07:54 AM    ANION 13.0 2023 07:54 AM    GLUCOSE 98 2024 09:12 AM    GLUCOSE 101 (H) 2023 07:54 AM    BUN 13 2024 09:12 AM    BUN 17 2023 07:54 AM    CREATININE 0.77 2024 09:12 AM    CREATININE 0.74 2023 07:54 AM    CREATININE 0.82 10/05/2022 12:00 AM    CALCIUM 9.8 2024 09:12 AM    CALCIUM 9.4 2023 07:54 AM    ASTSGOT 23 2024 09:12 AM    ASTSGOT 22 2023 07:54 AM    ALTSGPT 33 (H) 2024 09:12 AM    ALTSGPT 33 2023 07:54 AM    TBILIRUBIN 0.5 2024 09:12 AM    TBILIRUBIN 0.6 2023 07:54 AM    ALBUMIN 4.8 2024 09:12 AM    ALBUMIN 4.6 2023 07:54 AM    TOTPROTEIN 7.5 2024 09:12 AM    TOTPROTEIN 7.2 2023 07:54 AM    GLOBULIN 2.7 2024 09:12 AM    GLOBULIN 2.6 2023 07:54 AM    AGRATIO 1.8 2024 09:12 AM    AGRATIO 1.8 2023 07:54 AM       Lab Results   Component Value Date/Time    CHOLSTRLTOT 253 (H) 2024 0912    TRIGLYCERIDE 281 (H) 2024 0912    HDL 43 2024 0912    LDL 84 2023 0754       Lab Results   Component Value Date/Time    MALBCRT 15 2023 07:54 AM    MICROALBUR 2.7 2023 07:54 AM    MICRALB 58.1 2024 09:12 AM        Lab Results   Component Value Date/Time    TSHULTRASEN 0.640 2023 0754     Lab Results   Component Value Date/Time    FREEDIR 1.51 2024 0912     Lab Results   Component Value Date/Time    FREET3 3.43 2023 0754     No results found for: \"THYSTIMIG\"    IMAGIN2024 1:28 PM     HISTORY/REASON FOR EXAM:  Follow-up for multinodular goiter history of subclinical hyperthyroidism now resolved        TECHNIQUE/EXAM DESCRIPTION:  Ultrasound of the soft tissues of the head and neck.     COMPARISON:  Previous ultrasound from [place] done in      FINDINGS:  The thyroid gland is heterogeneous.  Vascularity is normal.     The " right lobe of the thyroid gland measures 6.10 cm x 2.37 cm x 2.56 cm cm.  The left lobe of the thyroid gland measures 5.63 cm x 1.92 cm x 2.06 cm cm.  The isthmus measures 0.42 cm cm.     Nodules >= 1cm:        Nodule #1  There is a(n) hypoechoic wider than tall spongiform nodule with ill-defined margins and no echogenic foci measuring 1.44 x 0.91 x 1.59 cm located on the right upper lobe near the isthmus. This nodule was previously biopsied and is stable     Nodule #2  There is a(n) hypoechoic wider than tall spongiform nodule with ill-defined margins and no echogenic foci measuring 1.05 x 0.59 x 1.01 cm located on the right lower lobe. This nodule was previously biopsied and is stable     Nodule #3  There is a(n) hypoechoic wider than tall spongiform nodule with smooth margins and no echogenic foci measuring 0.89 x 0.68 x 0.86 cm located on the left lower lobe. This nodule is newly detected              IMPRESSION:     Heterogenous thyroid gland with dominant: Stable spongiform nodules located on the right upper lobe, right lower lobe, and left lower lobe     MAYUR Recommendations  Observation - repeat US in 12 to 24 months in the office.              US report completed and dictated by  Jasiel Nelson MD, FACE, EC[facility]    ASSESSMENT/PLAN:     1. Controlled type 2 diabetes mellitus without complication, with long-term current use of insulin (HCC)  Previously controlled  Her insurance is not covering the 2 mg dose of Ozempic  We will reduce dose to 1 mg weekly  Restart metformin  Explained to her that metformin should work in combination with Ozempic to help improve her glucose control  She needs to get an updated urine albumin  Foot exam was completed today  Follow-up with clinical pharmacist in 3 months    2. Subclinical hyperthyroidism  Resolved she does not have endogenous progressive subclinical hypothyroidism and there is no evidence of Graves' disease   we will monitor thyroid function annually at this  time    3. Multinodular goiter  Stable  She has benign dominant solid nodules on the right upper lobe and right lower lobe both measuring more than 1.5 to 2 cm with benign biopsies  She had an ultrasound in the office on October 2024  Showing stable nodules  Recommend observation and repeating ultrasound again later this year with radiology      4. Dyslipidemia  Controlled  Continue restart atorvastatin  Repeat fasting lipids in 12 months    5. Vitamin D deficiency  Stable   Vitamin D labs were reviewed with patient  Continue current supplements  Continue monitoring levels       6. Microalbuminuria  Unstable  Continue ACE inhibitor  I want her to get an updated urine microalbumin now  Consider SGLT2 inhibitor in the future if this is progressive        Return in about 3 months (around 7/25/2025).        Total time spent on day of service was over 60 minutes which included obtaining a detailed history and physical exam, ordering labs, coordinating care and scheduling future follow-up      Thank you kindly for allowing me to participate in the diabetes care plan for this patient.    Jasiel Nelson MD, FACE, [facility]      CC:   Malika Ace M.D.

## 2025-05-06 LAB
ALBUMIN/CREAT UR: 13 MG/G CREAT (ref 0–29)
CREAT UR-MCNC: 141.1 MG/DL
MICROALBUMIN UR-MCNC: 18.3 UG/ML

## 2025-07-28 ENCOUNTER — OFFICE VISIT (OUTPATIENT)
Dept: ENDOCRINOLOGY | Facility: MEDICAL CENTER | Age: 55
End: 2025-07-28
Attending: INTERNAL MEDICINE
Payer: COMMERCIAL

## 2025-07-28 VITALS
BODY MASS INDEX: 29.76 KG/M2 | DIASTOLIC BLOOD PRESSURE: 78 MMHG | WEIGHT: 168 LBS | HEART RATE: 75 BPM | SYSTOLIC BLOOD PRESSURE: 127 MMHG

## 2025-07-28 DIAGNOSIS — E78.5 DYSLIPIDEMIA: ICD-10-CM

## 2025-07-28 DIAGNOSIS — E05.90 SUBCLINICAL HYPERTHYROIDISM: ICD-10-CM

## 2025-07-28 DIAGNOSIS — E55.9 VITAMIN D DEFICIENCY: ICD-10-CM

## 2025-07-28 DIAGNOSIS — R80.9 MICROALBUMINURIA: ICD-10-CM

## 2025-07-28 DIAGNOSIS — E04.2 MULTINODULAR GOITER: ICD-10-CM

## 2025-07-28 DIAGNOSIS — E11.9 CONTROLLED TYPE 2 DIABETES MELLITUS WITHOUT COMPLICATION, WITH LONG-TERM CURRENT USE OF INSULIN (HCC): ICD-10-CM

## 2025-07-28 DIAGNOSIS — Z79.4 CONTROLLED TYPE 2 DIABETES MELLITUS WITHOUT COMPLICATION, WITH LONG-TERM CURRENT USE OF INSULIN (HCC): ICD-10-CM

## 2025-07-28 LAB
HBA1C MFR BLD: 6.6 % (ref ?–5.8)
POCT INT CON NEG: NEGATIVE
POCT INT CON POS: POSITIVE

## 2025-07-28 PROCEDURE — 83036 HEMOGLOBIN GLYCOSYLATED A1C: CPT

## 2025-07-28 PROCEDURE — 99214 OFFICE O/P EST MOD 30 MIN: CPT

## 2025-07-28 NOTE — PROGRESS NOTES
Patient Consult Note    Primary care physician: Malika Ace M.D.    Reason for Consult: Management of Controlled Type 2 Diabetes    Date Referral Placed: 04/25/25    HPI:  Shakila Dickerson is a 54 y.o. old patient who comes in today for evaluation of above stated problem.    Allergies  Patient has no known allergies.    Current Diabetes Medication Regimen  Metformin ER: 500 mg BID  GLP-1 or GLP-1/GIP Agent: semaglutide (Ozempic) 1 mg weekly    Previous Diabetes Medications and Reason for Discontinuation  None    Preventative Medications  On ACEi/ARB: yes - lisinopril 10 mg BID  On statin or other lipid-lowering therapy: yes - atorvastatin 20 mg daily    Potential Barriers to Care:  Adherence: reports occasional missed doses of Ozempic, recommended for pt to set a weekly reminder/alarm  Side effects: none  Affordability: no issues    SMBG  Pt has home glucometer and proper testing technique - yes    Pt reports blood sugars:   Before Breakfast: average 135, 100-146    Hyperglycemia/Hypoglycemia  Hyperglycemia symptoms: nocturia, polyuria  Hypoglycemia awareness: Yes  Nocturnal hypoglycemia: None  Hypoglycemia:  None  Pt's treatment of Hypoglycemia: Discussed 15:15 Rule    Lifestyle  Current Exercise - treadmill x 30 mins daily when she is off of work, otherwise works at [employer] and does a lot of walking    Dietary (pt works night shift)  Breakfast - rice, fish/meat, veggies  Lunch - sandwich, salad/fruits  Snacks - biscuits, chips  Drinks - water, zero-sugar gatorade    Other  Pt would like to establish care with a new PCP. Referral to primary care placed.    Labs  Lab Results   Component Value Date/Time    HBA1C 6.6 (A) 07/28/2025 08:14 AM    HBA1C 7.4 (A) 03/26/2025 12:00 AM    HBA1C 5.9 (A) 01/16/2024 11:01 AM      Lab Results   Component Value Date/Time    SODIUM 140 01/08/2024 09:12 AM    SODIUM 142 04/29/2023 07:54 AM    POTASSIUM 4.3 01/08/2024 09:12 AM    POTASSIUM 4.4 04/29/2023 07:54 AM    CHLORIDE  103 01/08/2024 09:12 AM    CHLORIDE 106 04/29/2023 07:54 AM    CO2 23 01/08/2024 09:12 AM    CO2 23 04/29/2023 07:54 AM    GLUCOSE 98 01/08/2024 09:12 AM    GLUCOSE 101 (H) 04/29/2023 07:54 AM    BUN 13 01/08/2024 09:12 AM    BUN 17 04/29/2023 07:54 AM    CREATININE 0.77 01/08/2024 09:12 AM    CREATININE 0.74 04/29/2023 07:54 AM    CREATININE 0.82 10/05/2022 12:00 AM    BUNCREATRAT 17 01/08/2024 09:12 AM     Lab Results   Component Value Date/Time    ALKPHOSPHAT 101 01/08/2024 09:12 AM    ALKPHOSPHAT 104 (H) 04/29/2023 07:54 AM    ASTSGOT 23 01/08/2024 09:12 AM    ASTSGOT 22 04/29/2023 07:54 AM    ALTSGPT 33 (H) 01/08/2024 09:12 AM    ALTSGPT 33 04/29/2023 07:54 AM    TBILIRUBIN 0.5 01/08/2024 09:12 AM    TBILIRUBIN 0.6 04/29/2023 07:54 AM    ALBUMIN 4.8 01/08/2024 09:12 AM    ALBUMIN 4.6 04/29/2023 07:54 AM      Lab Results   Component Value Date/Time    CHOLSTRLTOT 146 03/26/2025 12:00 AM    LDL 78 03/26/2025 12:00 AM    HDL 40 03/26/2025 12:00 AM    TRIGLYCERIDE 159 03/26/2025 12:00 AM       Lab Results   Component Value Date/Time    MALBCRT 15 04/29/2023 07:54 AM    MICROALBUR 2.7 04/29/2023 07:54 AM       Physical Examination:  Vital signs: /78   Pulse 75   Wt 76.2 kg (168 lb)   BMI 29.76 kg/m²  Body mass index is 29.76 kg/m².    Assessment and Plan:    1. DM2  Basic physiology of DMII was explained to patient as well as microvascular/macrovascular complications. The importance of increasing physical activity to improve diabetes control was discussed with the patient. Patient was also educated on changing diet and making better choices to help control blood sugar.   Discussed Goals: FBG 80 - 130, 2hPP < 180, a1c < 7.0%  Last a1c drawn today was 6.6%, which is at goal and has improved since the previous reading of 7.4% (03/2025)  Average FBG near goal  Pt is tolerating her current regimen well and A1c shows improvement since last visit. Will continue current medications.  Goal LDL-C <100 mg/dl, LDL is  at goal. Pt is on moderate intensity statin  UACR WNL, pt is on ACEi    - Medication changes:  None   - Continue:  Metformin ER: 500 mg BID  GLP-1 or GLP-1/GIP Agent: semaglutide (Ozempic) 1 mg weekly     - Lifestyle changes:  Exercise Goal - increase cardio exercise to 30 mins x 5 days a week  Dietary Goal - limit carb intake especially for snacks, incorporate more protein    - Preventative management:  REC DM Score: 3  Care gaps addressed:   A1c is above 8%: A1c checked today and updated  Lipid panel is due: Lipid panel completed 03/2025, results abstracted  Retinal exam due: Reminded patient to complete retinal exam  Care gaps updated in Health Maintenance    Follow Up:  6 months, A1c due    Cynthia Randolph, MonaD    CC:   DANE Zimmer MD            [facility] Pharmacotherapy Program Consent      Name    Shakila Dickerson    MRN number: [id number]    the following are guidelines for participation in the [facility] Pharmacotherapy Program.     I, ____Shakila Dickerson_____, understand and voluntarily agree to participate in the [facility] Pharmacotherapy Program and to have services provided to me by pharmacists working in collaboration with my provider.    I understand the pharmacist within the [facility] Pharmacotherapy Program may initiate, modify or discontinue my medications, order appropriate testing and appointments, perform exams, monitor treatment, and make clinical evaluations and decisions pursuant to a collaborative practice agreement with my provider.  I understand the pharmacist within the [facility] Pharmacotherapy Program is not a physician, osteopathic physician, advanced practice registered nurse or physician assistant and may not diagnose.  I will take all my medications as instructed and not change the way I take it without first talking to my provider or a pharmacist within the [facility] Pharmacotherapy Program.  I understand that if I  am late to my appointment I may not be able to be seen by a pharmacist at that time and will have to reschedule my appointment.  During appointment with pharmacist I understand that pharmacist has the right not to answer questions or perform services outside the pharmacist’s scope of practice.  By signing below, I provide informed consent for the pharmacist to provide these services and for my participation in the [facility] Pharmacotherapy Program.      Gemma General Jayla           [id number]          07/28/25  Patient Name                   MRN number  Date     ____Obtained verbal consent from [facility] General Dickerson____  Patient Signature

## 2025-08-19 DIAGNOSIS — R80.9 MICROALBUMINURIA: ICD-10-CM

## 2025-08-19 RX ORDER — LISINOPRIL 10 MG/1
10 TABLET ORAL DAILY
Qty: 90 TABLET | Refills: 3 | Status: SHIPPED | OUTPATIENT
Start: 2025-08-19